# Patient Record
Sex: FEMALE | Race: AMERICAN INDIAN OR ALASKA NATIVE
[De-identification: names, ages, dates, MRNs, and addresses within clinical notes are randomized per-mention and may not be internally consistent; named-entity substitution may affect disease eponyms.]

---

## 2019-11-06 ENCOUNTER — HOSPITAL ENCOUNTER (EMERGENCY)
Dept: HOSPITAL 5 - ED | Age: 19
Discharge: HOME | End: 2019-11-06
Payer: SELF-PAY

## 2019-11-06 VITALS — DIASTOLIC BLOOD PRESSURE: 76 MMHG | SYSTOLIC BLOOD PRESSURE: 134 MMHG

## 2019-11-06 DIAGNOSIS — Z79.899: ICD-10-CM

## 2019-11-06 DIAGNOSIS — J06.9: ICD-10-CM

## 2019-11-06 DIAGNOSIS — J45.909: Primary | ICD-10-CM

## 2019-11-06 DIAGNOSIS — F17.200: ICD-10-CM

## 2019-11-06 PROCEDURE — 99283 EMERGENCY DEPT VISIT LOW MDM: CPT

## 2019-11-06 PROCEDURE — 71045 X-RAY EXAM CHEST 1 VIEW: CPT

## 2019-11-06 PROCEDURE — 96372 THER/PROPH/DIAG INJ SC/IM: CPT

## 2019-11-06 PROCEDURE — 94644 CONT INHLJ TX 1ST HOUR: CPT

## 2019-11-06 NOTE — EMERGENCY DEPARTMENT REPORT
- General


Chief Complaint: Dyspnea/Respdistress


Stated Complaint: ASTHMA


Source: patient


Mode of arrival: Ambulatory


Limitations: No Limitations





- History of Present Illness


Initial Comments: 





Patient is a 19-year-old -American female with a history of asthma but 

with no bronchodilators presents to the ED with a minimal acute onset persistent

nasal and sinus congestion, shortness of breath and wheezing with a cough for 

the last 2 days.  Patient states that the last time she used inhaler was 2 years

ago.  Patient denies chest pain, fever, chills, sore throat, nausea, vomiting, 

dizziness, headache, hemoptysis, abdominal pain, change in vision or syncope.


MD Complaint: cough, sore throat, rhinorrhea, nasal congestion, sinus pain, 

other (dry cough with wheezing)


-: Sudden, days(s) (2)


Severity scale (0 -10): 5


Quality: dull, aching


Consistency: intermittent


Improves With: nothing


Worsens With: nothing


Context: sick contacts


Associated Symptoms: denies other symptoms, myalgias, headache, rhinorrhea, 

nasal congestion, sore throat, cough, shortness of breath.  denies: fever, 

chills, diaphoresis, nausea, vomiting, diarrhea, rash, right sweats, weight 

loss, hoarseness, other


Treatments Prior to Arrival: none





- Related Data


                                  Previous Rx's











 Medication  Instructions  Recorded  Last Taken  Type


 


Albuterol Sulfate [Albuterol 0.63% 0.63 mg IH TID PRN #75 ml 11/06/19 Unknown Rx





NEBS]    


 


Albuterol Sulfate [Proair 1 puff IH Q4H PRN #1 aer.pow.ba 11/06/19 Unknown Rx





Respiclick]    


 


Ibuprofen [Motrin] 600 mg PO Q8H PRN #20 tablet 11/06/19 Unknown Rx


 


Prednisone [predniSONE 10 mg 10 mg PO .TAPER #21 tab.ds.pk 11/06/19 Unknown Rx





(6-Day Pack, 21 Tabs)]    











                                    Allergies











Allergy/AdvReac Type Severity Reaction Status Date / Time


 


No Known Allergies Allergy   Verified 11/06/19 01:26














ED Review of Systems


ROS: 


Stated complaint: ASTHMA


Other details as noted in HPI





Constitutional: denies: chills, fever


Eyes: denies: eye pain, eye discharge, vision change


ENT: congestion.  denies: ear pain, throat pain


Respiratory: cough, shortness of breath, wheezing


Cardiovascular: denies: chest pain, palpitations


Endocrine: no symptoms reported


Gastrointestinal: denies: abdominal pain, nausea, diarrhea


Genitourinary: denies: urgency, dysuria, discharge


Musculoskeletal: denies: back pain, joint swelling, arthralgia


Skin: denies: rash, lesions


Neurological: denies: headache, weakness, paresthesias


Psychiatric: denies: anxiety, depression


Hematological/Lymphatic: denies: easy bleeding, easy bruising





ED Past Medical Hx





- Past Medical History


Previous Medical History?: Yes


Hx Asthma: Yes





- Surgical History


Past Surgical History?: No





- Social History


Smoking Status: Current Every Day Smoker


Substance Use Type: None





- Medications


Home Medications: 


                                Home Medications











 Medication  Instructions  Recorded  Confirmed  Last Taken  Type


 


Albuterol Sulfate [Albuterol 0.63% 0.63 mg IH TID PRN #75 ml 11/06/19  Unknown 

Rx





NEBS]     


 


Albuterol Sulfate [Proair 1 puff IH Q4H PRN #1 aer.pow.ba 11/06/19  Unknown Rx





Respiclick]     


 


Ibuprofen [Motrin] 600 mg PO Q8H PRN #20 tablet 11/06/19  Unknown Rx


 


Prednisone [predniSONE 10 mg 10 mg PO .TAPER #21 tab.ds.pk 11/06/19  Unknown Rx





(6-Day Pack, 21 Tabs)]     














ED Physical Exam





- General


Limitations: No Limitations


General appearance: alert, in no apparent distress





- Head


Head exam: Present: atraumatic, normocephalic, normal inspection





- Eye


Eye exam: Present: normal appearance, PERRL, EOMI


Pupils: Present: normal accommodation





- ENT


ENT exam: Present: normal orophraynx, mucous membranes moist, TM's normal 

bilaterally, normal external ear exam, other (grossly congested nasal passages)





- Neck


Neck exam: Present: normal inspection, full ROM





- Respiratory


Respiratory exam: Present: wheezes (moderately coarse wheezes throughout).  

Absent: respiratory distress, rales, rhonchi, chest wall tenderness, accessory 

muscle use, decreased breath sounds, prolonged expiratory





- Cardiovascular


Cardiovascular Exam: Present: normal rhythm, tachycardia, normal heart sounds.  

Absent: systolic murmur, diastolic murmur, rubs, gallop





- GI/Abdominal


GI/Abdominal exam: Present: soft, normal bowel sounds.  Absent: tenderness, 

guarding, rebound, hyperactive bowel sounds, organomegaly





- Extremities Exam


Extremities exam: Present: normal inspection, full ROM, normal capillary refill





- Back Exam


Back exam: Present: normal inspection, full ROM.  Absent: tenderness, muscle 

spasm, paraspinal tenderness





- Neurological Exam


Neurological exam: Present: alert, oriented X3, CN II-XII intact, normal gait, 

reflexes normal





- Psychiatric


Psychiatric exam: Present: normal affect, normal mood





- Skin


Skin exam: Present: warm, dry, intact, normal color.  Absent: rash





ED Course





                                   Vital Signs











  11/06/19 11/06/19





  01:28 01:59


 


Temperature 98.5 F 


 


Pulse Rate 103 H 


 


Pulse Rate [  104 H





Bilateral]  


 


Respiratory 20 





Rate  


 


Respiratory  24





Rate [Bilateral  





]  


 


Blood Pressure 151/75 





[Right]  


 


O2 Sat by Pulse 100 





Oximetry  














- Reevaluation(s)


Reevaluation #1: 





11/06/19 02:38


This is 19-year-old female with a history of asthma who presented to the ED with

 acute onset persistent shortness of breath, wheezing, dry cough, nasal and 

sinus congestion for 2 days.  Patient states that she does not use albuterol  

inhaler or nebulizer home, having been out all his medications 2 years ago.  

Patient was treated with DuoNeb and albuterol, Solu-Medrol and chest x-ray shows

 no acute cardiopulmonary abnormalities.  On reevaluation, patient's wheezing is

 resolved patient feeling better and was discharged home on medications 

including albuterol inhalers, steroid Dosepak and albuterol nebulizers.  Patient

 was advised to follow-up with [facility] for further evaluation

 or return to the ED immediately if symptoms get worse.





ED Medical Decision Making





- Radiology Data


Radiology results: report reviewed, image reviewed





Chest x-ray shows no acute cardiopulmonary abnormalities or pneumonitis.





- Medical Decision Making





This is 19-year-old female with a history of asthma who presented to the ED with

 acute onset persistent shortness of breath, wheezing, dry cough, nasal and 

sinus congestion for 2 days.  Patient states that she does not use albuterol  

inhaler or nebulizer home, having been out all his medications 2 years ago.  

Patient was treated with DuoNeb and albuterol, Solu-Medrol and chest x-ray shows

 no acute cardiopulmonary abnormalities.  On reevaluation, patient's wheezing is

 resolved patient feeling better and was discharged home on medications 

including albuterol inhalers, steroid Dosepak and albuterol nebulizers.  Patient

 was advised to follow-up with [facility] for further evaluation

 or return to the ED immediately if symptoms get worse.





- Differential Diagnosis


Asthma; bronchitis; URI; Pneumonia


Critical care attestation.: 


If time is entered above; I have spent that time in minutes in the direct care 

of this critically ill patient, excluding procedure time.








ED Disposition


Clinical Impression: 


 Acute asthmatic bronchitis, Acute upper respiratory infection





Disposition: DC-01 TO HOME OR SELFCARE


Is pt being admited?: No


Does the pt Need Aspirin: No


Condition: Stable


Instructions:  Acute Bronchitis (ED)


Additional Instructions: 


Take medications as advised, drink plenty of fluids and follow-up with your 

primary care physician in 7-10 days for reevaluation.  Return to the ED 

immediately if symptoms get worse.


Prescriptions: 


Albuterol Sulfate [Albuterol 0.63% NEBS] 0.63 mg IH TID PRN #75 ml


 PRN Reason: Wheezing


Ibuprofen [Motrin] 600 mg PO Q8H PRN #20 tablet


 PRN Reason: Pain


Prednisone [predniSONE 10 mg (6-Day Pack, 21 Tabs)] 10 mg PO .TAPER #21 

tab.ds.pk


Albuterol Sulfate [Proair Respiclick] 1 puff IH Q4H PRN #1 aer.pow.ba


 PRN Reason: Dyspnea


Referrals: 


[facility] [Outside] - 3-5 Days


Forms:  Work/School Release Form(ED)


Time of Disposition: 02:42


Print Language: ENGLISH

## 2019-11-06 NOTE — XRAY REPORT
CHEST 1 VIEW 11/6/2019 1:57 AM



INDICATION / CLINICAL INFORMATION:

cough, asthma.



COMPARISON: 

None available.



FINDINGS:



SUPPORT DEVICES: None.



HEART / MEDIASTINUM: No significant abnormality. 



LUNGS / PLEURA: No significant pulmonary or pleural abnormality. No pneumothorax. 



ADDITIONAL FINDINGS: No significant additional findings.



IMPRESSION:

1. No acute findings.



Signer Name: KRYSTLE Gilbert MD 

Signed: 11/6/2019 2:21 AM

 Workstation Name: [id number]